# Patient Record
Sex: MALE | Race: WHITE | Employment: OTHER | URBAN - METROPOLITAN AREA
[De-identification: names, ages, dates, MRNs, and addresses within clinical notes are randomized per-mention and may not be internally consistent; named-entity substitution may affect disease eponyms.]

---

## 2017-03-28 ENCOUNTER — TELEPHONE (OUTPATIENT)
Dept: PRIMARY CARE CLINIC | Age: 47
End: 2017-03-28

## 2017-04-11 LAB
CHOLESTEROL, TOTAL: 195 MG/DL
CHOLESTEROL/HDL RATIO: 8.1
HDLC SERPL-MCNC: 24 MG/DL (ref 35–70)
LDL CHOLESTEROL CALCULATED: 97 MG/DL (ref 0–160)
TRIGL SERPL-MCNC: 372 MG/DL
VLDLC SERPL CALC-MCNC: 74 MG/DL

## 2017-04-13 ENCOUNTER — OFFICE VISIT (OUTPATIENT)
Dept: PRIMARY CARE CLINIC | Age: 47
End: 2017-04-13
Payer: COMMERCIAL

## 2017-04-13 VITALS — SYSTOLIC BLOOD PRESSURE: 130 MMHG | DIASTOLIC BLOOD PRESSURE: 68 MMHG | HEART RATE: 100 BPM

## 2017-04-13 DIAGNOSIS — E78.1 HYPERTRIGLYCERIDEMIA: ICD-10-CM

## 2017-04-13 DIAGNOSIS — E66.9 OBESITY (BMI 30-39.9): ICD-10-CM

## 2017-04-13 DIAGNOSIS — R03.0 PREHYPERTENSION: Primary | ICD-10-CM

## 2017-04-13 DIAGNOSIS — Z01.89 ENCOUNTER FOR BLOOD TEST: ICD-10-CM

## 2017-04-13 PROCEDURE — 99214 OFFICE O/P EST MOD 30 MIN: CPT | Performed by: INTERNAL MEDICINE

## 2017-04-13 RX ORDER — FENOFIBRATE 48 MG/1
48 TABLET, COATED ORAL DAILY
Qty: 30 TABLET | Refills: 3 | Status: SHIPPED | OUTPATIENT
Start: 2017-04-13 | End: 2018-04-26

## 2017-04-13 ASSESSMENT — PATIENT HEALTH QUESTIONNAIRE - PHQ9
SUM OF ALL RESPONSES TO PHQ QUESTIONS 1-9: 0
1. LITTLE INTEREST OR PLEASURE IN DOING THINGS: 0
SUM OF ALL RESPONSES TO PHQ9 QUESTIONS 1 & 2: 0
2. FEELING DOWN, DEPRESSED OR HOPELESS: 0

## 2017-04-16 ASSESSMENT — ENCOUNTER SYMPTOMS
ABDOMINAL PAIN: 0
TROUBLE SWALLOWING: 0
VOMITING: 0
COUGH: 0
EYE DISCHARGE: 0
NAUSEA: 0
SORE THROAT: 0
EYE REDNESS: 0
BACK PAIN: 0
SHORTNESS OF BREATH: 0

## 2017-04-19 ENCOUNTER — TELEPHONE (OUTPATIENT)
Dept: PRIMARY CARE CLINIC | Age: 47
End: 2017-04-19

## 2017-04-20 ENCOUNTER — TELEPHONE (OUTPATIENT)
Dept: PRIMARY CARE CLINIC | Age: 47
End: 2017-04-20

## 2017-04-20 DIAGNOSIS — Z01.89 ENCOUNTER FOR BLOOD TEST: ICD-10-CM

## 2017-04-20 DIAGNOSIS — R68.82 LIBIDO, DECREASED: ICD-10-CM

## 2017-04-20 DIAGNOSIS — R53.83 FATIGUE, UNSPECIFIED TYPE: ICD-10-CM

## 2017-06-10 ENCOUNTER — TELEPHONE (OUTPATIENT)
Dept: INTERNAL MEDICINE CLINIC | Age: 47
End: 2017-06-10

## 2017-06-10 RX ORDER — METRONIDAZOLE 500 MG/1
500 TABLET ORAL 3 TIMES DAILY
Qty: 30 TABLET | Refills: 0 | Status: SHIPPED | OUTPATIENT
Start: 2017-06-10 | End: 2017-06-20

## 2017-06-14 ENCOUNTER — OFFICE VISIT (OUTPATIENT)
Dept: PRIMARY CARE CLINIC | Age: 47
End: 2017-06-14
Payer: COMMERCIAL

## 2017-06-14 VITALS
WEIGHT: 285 LBS | DIASTOLIC BLOOD PRESSURE: 66 MMHG | HEIGHT: 72 IN | SYSTOLIC BLOOD PRESSURE: 110 MMHG | RESPIRATION RATE: 16 BRPM | HEART RATE: 74 BPM | BODY MASS INDEX: 38.6 KG/M2

## 2017-06-14 DIAGNOSIS — R19.7 DIARRHEA, UNSPECIFIED TYPE: Primary | ICD-10-CM

## 2017-06-14 PROCEDURE — 99214 OFFICE O/P EST MOD 30 MIN: CPT | Performed by: INTERNAL MEDICINE

## 2017-06-14 RX ORDER — CIPROFLOXACIN 500 MG/1
500 TABLET, FILM COATED ORAL 2 TIMES DAILY
Qty: 20 TABLET | Refills: 0 | Status: SHIPPED | OUTPATIENT
Start: 2017-06-14 | End: 2017-06-24

## 2017-06-14 ASSESSMENT — ENCOUNTER SYMPTOMS
NAUSEA: 0
SORE THROAT: 0
ABDOMINAL PAIN: 0
SHORTNESS OF BREATH: 0
VOMITING: 0
BACK PAIN: 0
DIARRHEA: 1
EYE REDNESS: 0
EYE DISCHARGE: 0
TROUBLE SWALLOWING: 0
COUGH: 1

## 2017-11-02 ENCOUNTER — OFFICE VISIT (OUTPATIENT)
Dept: PRIMARY CARE CLINIC | Age: 47
End: 2017-11-02
Payer: COMMERCIAL

## 2017-11-02 VITALS
HEIGHT: 72 IN | BODY MASS INDEX: 38.41 KG/M2 | WEIGHT: 283.6 LBS | SYSTOLIC BLOOD PRESSURE: 112 MMHG | HEART RATE: 85 BPM | DIASTOLIC BLOOD PRESSURE: 78 MMHG | RESPIRATION RATE: 16 BRPM | OXYGEN SATURATION: 95 %

## 2017-11-02 DIAGNOSIS — S69.90XA FINGER INJURY, UNSPECIFIED LATERALITY, INITIAL ENCOUNTER: Primary | ICD-10-CM

## 2017-11-02 DIAGNOSIS — Z13.1 ENCOUNTER FOR SCREENING FOR DIABETES MELLITUS: ICD-10-CM

## 2017-11-02 DIAGNOSIS — E78.1 HYPERTRIGLYCERIDEMIA: ICD-10-CM

## 2017-11-02 PROCEDURE — 90471 IMMUNIZATION ADMIN: CPT | Performed by: INTERNAL MEDICINE

## 2017-11-02 PROCEDURE — 99213 OFFICE O/P EST LOW 20 MIN: CPT | Performed by: INTERNAL MEDICINE

## 2017-11-02 PROCEDURE — 90714 TD VACC NO PRESV 7 YRS+ IM: CPT | Performed by: INTERNAL MEDICINE

## 2017-11-02 ASSESSMENT — ENCOUNTER SYMPTOMS
COUGH: 0
VOMITING: 0
NAUSEA: 0
EYE REDNESS: 0
ABDOMINAL PAIN: 0
SORE THROAT: 0
SHORTNESS OF BREATH: 0
BACK PAIN: 0
TROUBLE SWALLOWING: 0
EYE DISCHARGE: 0

## 2017-11-02 NOTE — PROGRESS NOTES
Constitutional: Negative for activity change, appetite change, fatigue, fever and unexpected weight change. HENT: Negative for postnasal drip, sore throat and trouble swallowing. Eyes: Negative for discharge and redness. Respiratory: Negative for cough and shortness of breath. Cardiovascular: Negative for chest pain and palpitations. Gastrointestinal: Negative for abdominal pain, nausea and vomiting. Endocrine: Negative for cold intolerance and heat intolerance. Genitourinary: Negative for difficulty urinating and dysuria. Musculoskeletal: Negative for arthralgias, back pain and myalgias. Skin: Negative for rash and wound. Neurological: Negative for dizziness and headaches. Hematological: Negative for adenopathy. Psychiatric/Behavioral: Negative for behavioral problems. The patient is not nervous/anxious. Objective:     Physical Exam   Constitutional: He is oriented to person, place, and time. He appears well-developed and well-nourished. No distress. HENT:   Head: Normocephalic and atraumatic. Right Ear: External ear normal.   Left Ear: External ear normal.   Nose: Nose normal.   Mouth/Throat: Oropharynx is clear and moist. No oropharyngeal exudate. Eyes: Conjunctivae are normal. Right eye exhibits no discharge. Left eye exhibits no discharge. No scleral icterus. Neck: Neck supple. Cardiovascular: Normal rate, regular rhythm and normal heart sounds. No murmur heard. Pulmonary/Chest: Effort normal and breath sounds normal. No respiratory distress. He has no wheezes. Abdominal: Soft. Bowel sounds are normal. He exhibits no distension. There is no tenderness. Musculoskeletal: He exhibits no edema or tenderness. Lymphadenopathy:     He has no cervical adenopathy. Neurological: He is alert and oriented to person, place, and time. Skin: Skin is warm and dry. No rash noted. He is not diaphoretic.    Right hand index finger tip laceration , clean s/p bandage Psychiatric: Judgment and thought content normal.   Nursing note and vitals reviewed. /78   Pulse 85   Resp 16   Ht 5' 11.75\" (1.822 m)   Wt 283 lb 9.6 oz (128.6 kg)   SpO2 95%   BMI 38.73 kg/m²     Assessment:      1. Finger injury, unspecified laterality, initial encounter  Td vaccine preservative free (7y and older) IM (Sandee Alvarez)   2. Encounter for screening for diabetes mellitus  Glucose, Fasting   3. Hypertriglyceridemia  Lipid Panel             Plan:      Return if symptoms worsen or fail to improve. Diet  Exercise  Weight loss    Orders Placed This Encounter   Procedures    Td vaccine preservative free (7y and older) IM (DECAVAC,TENIVAC)    Glucose, Fasting     Standing Status:   Future     Standing Expiration Date:   11/2/2018    Lipid Panel     Standing Status:   Future     Standing Expiration Date:   11/2/2018     Order Specific Question:   Is Patient Fasting?/# of Hours     Answer:   12 hr fasting needed     No orders of the defined types were placed in this encounter. Patient given educational materials - see patient instructions. Discussed use, benefit, and side effects of prescribed medications. All patient questions answered. Pt voiced understanding. Reviewed health maintenance. Instructed to continue current medications, diet and exercise. Patient agreed with treatment plan. Follow up as directed.      Electronically signed by Nasreen Mota MD on 11/2/2017 at 11:29 AM

## 2018-03-20 ENCOUNTER — TELEPHONE (OUTPATIENT)
Dept: PRIMARY CARE CLINIC | Age: 48
End: 2018-03-20

## 2018-04-26 ENCOUNTER — OFFICE VISIT (OUTPATIENT)
Dept: PRIMARY CARE CLINIC | Age: 48
End: 2018-04-26
Payer: COMMERCIAL

## 2018-04-26 VITALS
WEIGHT: 282 LBS | DIASTOLIC BLOOD PRESSURE: 64 MMHG | HEIGHT: 72 IN | OXYGEN SATURATION: 96 % | SYSTOLIC BLOOD PRESSURE: 126 MMHG | BODY MASS INDEX: 38.19 KG/M2 | HEART RATE: 90 BPM

## 2018-04-26 DIAGNOSIS — R10.31 RIGHT LOWER QUADRANT ABDOMINAL PAIN: Primary | ICD-10-CM

## 2018-04-26 PROCEDURE — 99213 OFFICE O/P EST LOW 20 MIN: CPT | Performed by: NURSE PRACTITIONER

## 2018-04-26 ASSESSMENT — ENCOUNTER SYMPTOMS
RECTAL PAIN: 0
CONSTIPATION: 1
DIARRHEA: 0
FLATUS: 1
ABDOMINAL DISTENTION: 0
BELCHING: 0
VOMITING: 0
HEMATOCHEZIA: 0
NAUSEA: 1
ABDOMINAL PAIN: 1
SHORTNESS OF BREATH: 0
CHEST TIGHTNESS: 0
TROUBLE SWALLOWING: 0
BLOOD IN STOOL: 0

## 2018-04-26 ASSESSMENT — PATIENT HEALTH QUESTIONNAIRE - PHQ9
2. FEELING DOWN, DEPRESSED OR HOPELESS: 0
SUM OF ALL RESPONSES TO PHQ9 QUESTIONS 1 & 2: 0
SUM OF ALL RESPONSES TO PHQ QUESTIONS 1-9: 0
1. LITTLE INTEREST OR PLEASURE IN DOING THINGS: 0

## 2018-04-27 ENCOUNTER — TELEPHONE (OUTPATIENT)
Dept: PRIMARY CARE CLINIC | Age: 48
End: 2018-04-27

## 2018-05-09 ENCOUNTER — OFFICE VISIT (OUTPATIENT)
Dept: PRIMARY CARE CLINIC | Age: 48
End: 2018-05-09
Payer: COMMERCIAL

## 2018-05-09 VITALS
WEIGHT: 277 LBS | HEIGHT: 72 IN | OXYGEN SATURATION: 97 % | SYSTOLIC BLOOD PRESSURE: 127 MMHG | HEART RATE: 74 BPM | DIASTOLIC BLOOD PRESSURE: 70 MMHG | BODY MASS INDEX: 37.52 KG/M2

## 2018-05-09 DIAGNOSIS — K57.32 DIVERTICULITIS OF LARGE INTESTINE WITHOUT PERFORATION OR ABSCESS WITHOUT BLEEDING: Primary | ICD-10-CM

## 2018-05-09 DIAGNOSIS — Z87.898 HISTORY OF CHEST PAIN: ICD-10-CM

## 2018-05-09 PROBLEM — K57.92 DIVERTICULITIS OF INTESTINE: Status: ACTIVE | Noted: 2018-05-09

## 2018-05-09 PROCEDURE — 99214 OFFICE O/P EST MOD 30 MIN: CPT | Performed by: NURSE PRACTITIONER

## 2018-05-09 RX ORDER — HYDROCODONE BITARTRATE AND ACETAMINOPHEN 5; 325 MG/1; MG/1
TABLET ORAL
COMMUNITY
Start: 2018-04-26

## 2018-05-09 RX ORDER — ONDANSETRON 4 MG/1
TABLET, ORALLY DISINTEGRATING ORAL
COMMUNITY
Start: 2018-05-05

## 2018-05-09 RX ORDER — LEVOFLOXACIN 500 MG/1
TABLET, FILM COATED ORAL
COMMUNITY
Start: 2018-05-01

## 2018-05-09 RX ORDER — METRONIDAZOLE 500 MG/1
TABLET ORAL
COMMUNITY
Start: 2018-04-26

## 2018-05-09 RX ORDER — CIPROFLOXACIN 500 MG/1
TABLET, FILM COATED ORAL
COMMUNITY
Start: 2018-04-26

## 2018-05-09 ASSESSMENT — ENCOUNTER SYMPTOMS
ABDOMINAL DISTENTION: 0
ABDOMINAL PAIN: 1
CHEST TIGHTNESS: 0
NAUSEA: 1
CONSTIPATION: 1
SHORTNESS OF BREATH: 0
BLOOD IN STOOL: 0
VOMITING: 0